# Patient Record
Sex: FEMALE | Race: WHITE | NOT HISPANIC OR LATINO | Employment: UNEMPLOYED | URBAN - METROPOLITAN AREA
[De-identification: names, ages, dates, MRNs, and addresses within clinical notes are randomized per-mention and may not be internally consistent; named-entity substitution may affect disease eponyms.]

---

## 2017-06-05 ENCOUNTER — TELEPHONE (OUTPATIENT)
Dept: PEDIATRIC CARDIOLOGY | Facility: CLINIC | Age: 22
End: 2017-06-05

## 2017-06-05 DIAGNOSIS — Q21.20 AV CANAL: Primary | ICD-10-CM

## 2017-07-31 ENCOUNTER — CLINICAL SUPPORT (OUTPATIENT)
Dept: PEDIATRIC CARDIOLOGY | Facility: CLINIC | Age: 22
End: 2017-07-31
Payer: COMMERCIAL

## 2017-07-31 ENCOUNTER — OFFICE VISIT (OUTPATIENT)
Dept: PEDIATRIC CARDIOLOGY | Facility: CLINIC | Age: 22
End: 2017-07-31
Payer: COMMERCIAL

## 2017-07-31 VITALS
HEART RATE: 90 BPM | DIASTOLIC BLOOD PRESSURE: 62 MMHG | BODY MASS INDEX: 25.64 KG/M2 | WEIGHT: 127.19 LBS | HEIGHT: 59 IN | SYSTOLIC BLOOD PRESSURE: 110 MMHG

## 2017-07-31 DIAGNOSIS — Q21.23 ATRIOVENTRICULAR CANAL (AVC), COMPLETE: Primary | ICD-10-CM

## 2017-07-31 DIAGNOSIS — I44.0 AV BLOCK, 1ST DEGREE: ICD-10-CM

## 2017-07-31 DIAGNOSIS — Q24.9 ADULT CONGENITAL HEART DISEASE: ICD-10-CM

## 2017-07-31 DIAGNOSIS — I34.0 NON-RHEUMATIC MITRAL REGURGITATION: ICD-10-CM

## 2017-07-31 DIAGNOSIS — Q90.9 DOWN'S SYNDROME: ICD-10-CM

## 2017-07-31 DIAGNOSIS — Q21.20 AV CANAL: ICD-10-CM

## 2017-07-31 DIAGNOSIS — E78.1 HYPERTRIGLYCERIDEMIA: ICD-10-CM

## 2017-07-31 DIAGNOSIS — I45.10 RIGHT BUNDLE BRANCH BLOCK: ICD-10-CM

## 2017-07-31 DIAGNOSIS — Z87.74 STATUS POST SURGERY FOR COMPLEX CONGENITAL HEART DISEASE: ICD-10-CM

## 2017-07-31 PROCEDURE — 93303 ECHO TRANSTHORACIC: CPT | Mod: 26,,, | Performed by: PEDIATRICS

## 2017-07-31 PROCEDURE — 93325 DOPPLER ECHO COLOR FLOW MAPG: CPT | Mod: 26,,, | Performed by: PEDIATRICS

## 2017-07-31 PROCEDURE — 93320 DOPPLER ECHO COMPLETE: CPT | Mod: 26,,, | Performed by: PEDIATRICS

## 2017-07-31 PROCEDURE — 93227 XTRNL ECG REC<48 HR R&I: CPT | Mod: ,,, | Performed by: PEDIATRICS

## 2017-07-31 PROCEDURE — 99215 OFFICE O/P EST HI 40 MIN: CPT | Mod: 25,S$GLB,, | Performed by: PEDIATRICS

## 2017-07-31 NOTE — PROGRESS NOTES
2017    re:Misti August  :1995       Misti August is a 22 y.o. 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  She also has Down's syndrome.  Since her last clinic visit one year ago she has done very well.  She has had no dyspnea on exertion, cyanosis, tachypnea, chest pain, palpitations, syncope, or edema.  She is active in dance class.  Mom is worried about her weight gain in spite of eating healthy.  She was placed on BCP due to severe menstrual cramping, and it has helped a lot.  She recently had lab work that was essentially normal except for a mildly elevated TG level.  Her HDL and LDL are normal.    The review of systems is as noted above. It is otherwise negative for other symptoms related to the general, neurological, psychiatric, endocrine, gastrointestinal, genitourinary, respiratory, dermatologic, musculoskeletal, hematologic, and immunologic systems.    Past Medical History:   Diagnosis Date    Atrioventricular canal (AVC), complete     AV block, 1st degree     Developmental delay     down syndrome    Mitral regurgitation      Past Surgical History:   Procedure Laterality Date    ATRIOVENTRICULAR CANAL REPAIR, COMPLETE       Family History   Problem Relation Age of Onset    No Known Problems Mother     No Known Problems Father     No Known Problems Sister     No Known Problems Brother     No Known Problems Maternal Grandmother     Lung cancer Maternal Grandfather     Heart disease Paternal Grandmother     Heart disease Paternal Grandfather     No Known Problems Maternal Aunt     No Known Problems Maternal Uncle     No Known Problems Paternal Aunt     No Known Problems Paternal Uncle     No Known Problems Other     Anemia Neg Hx     Arrhythmia Neg Hx     Asthma Neg Hx     Clotting disorder Neg Hx     Fainting Neg Hx     Heart attack Neg Hx     Heart failure Neg Hx      "Hyperlipidemia Neg Hx     Hypertension Neg Hx     Stroke Neg Hx     Atrial Septal Defect Neg Hx      Social History     Social History    Marital status: Single     Spouse name: N/A    Number of children: N/A    Years of education: N/A     Social History Main Topics    Smoking status: Never Smoker    Smokeless tobacco: None    Alcohol use No    Drug use: No    Sexual activity: Not Asked     Other Topics Concern    None     Social History Narrative    Lives at home with parents.     Current Outpatient Prescriptions on File Prior to Visit   Medication Sig Dispense Refill    desog-e.estradiol/e.estradiol (AZURETTE, 28,) 0.15-0.02 mgx21 /0.01 mg x 5 per tablet Take 1 tablet by mouth once daily.       No current facility-administered medications on file prior to visit.      No Known Allergies    /62 (BP Location: Right arm, Patient Position: Sitting, BP Method: Automatic)   Pulse 90   Ht 4' 11" (1.499 m)   Wt 57.7 kg (127 lb 3.2 oz)   LMP 07/10/2017 (Approximate)   BMI 25.69 kg/m²   Wt Readings from Last 3 Encounters:   07/31/17 57.7 kg (127 lb 3.2 oz)   07/28/16 54.9 kg (121 lb)   07/23/15 58.8 kg (129 lb 9.6 oz) (55 %, Z= 0.13)*     * Growth percentiles are based on [facility] 2-20 Years data.     Ht Readings from Last 3 Encounters:   07/31/17 4' 11" (1.499 m)   07/28/16 4' 9" (1.448 m)   07/23/15 4' 11" (1.499 m) (2 %, Z= -2.06)*     * Growth percentiles are based on [facility] 2-20 Years data.     Body mass index is 25.69 kg/m².  [unfilled]  Facility age limit for growth percentiles is 20 years.  Facility age limit for growth percentiles is 20 years.  In general, she is a very healthy-appearing dysmorphic female in no apparent distress.  She has Down's syndrome.  She is mildly overweight, with her weight up about 6 pounds since last year.  Eyelids and conjunctiva are free of erythema and drainage.  The eyes, nares, and oropharynx are clear.  The head is normocephalic and atraumatic.  The neck is supple without " jugular venous distention or thyroid enlargement.  The lungs are clear to auscultation bilaterally.  There is a well-healed median sternotomy incision.  The first and second heart sounds are normal.  There are no murmurs, gallops, rubs, or clicks in the supine or standing position.  The abdominal exam is benign without hepatosplenomegaly, tenderness, or distention.  Pulses are normal in all 4 extremities with brisk capillary refill and no clubbing, cyanosis, or edema.  No rashes are noted.    I personally reviewed the following tests performed today and my interpretation follows:  Her EKG reveals normal sinus rhythm with an incomplete right bundle branch block pattern and a NW axis.  Her echocardiogram reveals:  Images consistent with repaired AV canal-  Reconstructed tricuspid valve with trivial to mild insufficiency and no evidence of stenosis.  Normal right ventricular size.  Qualitatively good right ventricular systolic function.  RV pressure estimated as normal  Reconstructed mitral valve with mild insufficiency with predominant jet at small residual cleft in thickened anterior leaflet and no evidence of significant stenosis.  Structurally normal left ventricle with normal function  No evidence of LV outflow obstruction.  Possible enlarged right coronary artery.    Diagnoses:  1.  Very well repaired AV Canal with mild residual mitral valve insufficiency and no stenosis.  No residual shunts with excellent biventricular function.  No evidence of pulmonary hypertension.  2.  Down syndrome  3.  Concern over weight gain - She seems to hover around 125 lbs  4.  Mildly elevated TG    Discussion:  She looks great from a cardiovascular standpoint.  Her risk of endocarditis is extremely low, but given her mild residual insufficiency through the surgically repaired mitral valve and her previous tolerance of prophylactic antibodies, I agree with continuing SBEP before procedures.  Holter today.    I am very pleased with  her exercise and diet.  I commended the patient and her mother on this.  Continued healthy diet and regular low intensity exercise is recommended.  I also recommended daily fish oil.    - Follow up in 1 year in [facility] clinic with Dr. Zelaya for ECG, echo and Holter    Thank you for referring this patient to our clinic.  Please call with any questions.    Sincerely,        Reba Finn MD  Pediatric Cardiology  [facility] [address] [place]  [address]  [phone]

## 2017-08-01 PROBLEM — E78.1 HYPERTRIGLYCERIDEMIA: Status: ACTIVE | Noted: 2017-08-01

## 2018-05-30 ENCOUNTER — TELEPHONE (OUTPATIENT)
Dept: PEDIATRIC CARDIOLOGY | Facility: CLINIC | Age: 23
End: 2018-05-30

## 2018-05-30 NOTE — TELEPHONE ENCOUNTER
Schedule appt with Dr. Zelaya in [facility] on 7/9/18 @11:00am  ----- Message from Brittanie Haile sent at 5/30/2018  9:08 AM CDT -----  Contact: Pt catia Cochran can be reached at [phone]  Sammie called to schedule annual visit in [place] and all labs with appt.      Thank you!

## 2018-07-09 ENCOUNTER — CLINICAL SUPPORT (OUTPATIENT)
Dept: PEDIATRIC CARDIOLOGY | Facility: CLINIC | Age: 23
End: 2018-07-09
Payer: COMMERCIAL

## 2018-07-09 ENCOUNTER — OFFICE VISIT (OUTPATIENT)
Dept: PEDIATRIC CARDIOLOGY | Facility: CLINIC | Age: 23
End: 2018-07-09
Payer: COMMERCIAL

## 2018-07-09 VITALS
HEART RATE: 76 BPM | SYSTOLIC BLOOD PRESSURE: 98 MMHG | RESPIRATION RATE: 20 BRPM | OXYGEN SATURATION: 100 % | HEIGHT: 59 IN | BODY MASS INDEX: 24.76 KG/M2 | DIASTOLIC BLOOD PRESSURE: 60 MMHG | WEIGHT: 122.81 LBS

## 2018-07-09 DIAGNOSIS — Q24.9 ADULT CONGENITAL HEART DISEASE: ICD-10-CM

## 2018-07-09 DIAGNOSIS — Q90.9 DOWN'S SYNDROME: ICD-10-CM

## 2018-07-09 DIAGNOSIS — Q21.20 AV CANAL: Primary | ICD-10-CM

## 2018-07-09 DIAGNOSIS — Q21.20 AV CANAL: ICD-10-CM

## 2018-07-09 DIAGNOSIS — E78.1 HYPERTRIGLYCERIDEMIA: ICD-10-CM

## 2018-07-09 DIAGNOSIS — I44.0 AV BLOCK, 1ST DEGREE: ICD-10-CM

## 2018-07-09 DIAGNOSIS — Q21.23 ATRIOVENTRICULAR CANAL (AVC), COMPLETE: Primary | ICD-10-CM

## 2018-07-09 DIAGNOSIS — I34.0 NON-RHEUMATIC MITRAL REGURGITATION: ICD-10-CM

## 2018-07-09 DIAGNOSIS — Z87.74 STATUS POST SURGERY FOR COMPLEX CONGENITAL HEART DISEASE: ICD-10-CM

## 2018-07-09 PROCEDURE — 93304 ECHO TRANSTHORACIC: CPT | Mod: 26,,, | Performed by: PEDIATRICS

## 2018-07-09 PROCEDURE — 93325 DOPPLER ECHO COLOR FLOW MAPG: CPT | Mod: 26,,, | Performed by: PEDIATRICS

## 2018-07-09 PROCEDURE — 93321 DOPPLER ECHO F-UP/LMTD STD: CPT | Mod: 26,,, | Performed by: PEDIATRICS

## 2018-07-09 PROCEDURE — 99214 OFFICE O/P EST MOD 30 MIN: CPT | Mod: S$GLB,,, | Performed by: PEDIATRICS

## 2018-07-09 RX ORDER — LEVOTHYROXINE SODIUM 50 UG/1
50 TABLET ORAL DAILY
Qty: 30 TABLET | Refills: 11
Start: 2018-07-09 | End: 2019-07-09

## 2018-07-09 RX ORDER — NORGESTIMATE AND ETHINYL ESTRADIOL 7DAYSX3 LO
1 KIT ORAL DAILY
Qty: 30 TABLET | Refills: 11
Start: 2018-07-09 | End: 2019-07-09

## 2018-07-09 NOTE — PROGRESS NOTES
2018    re:Misti August  :1995    [facility]    Clarke Haines MD  [address] [facility] [address]    Dear Dr. Jerome:    Misti August is a 23 y.o. female seen today in the [facility] in [place] for follow-up of her surgically repaired complete AV canal defect.  She also has Down's syndrome.  To summarize, her diagnoses are as follows:  1.  Very well repaired AV Canal with trivial residual mitral valve insufficiency and no stenosis.  No residual shunts with excellent biventricular function.  No evidence of pulmonary hypertension.  2.  Down syndrome  3.  Much improved obesity due to improved diet and exercise  4.  Newly diagnosed hypothyroidism  5.  Mild hypertriglyceridemia with otherwise excellent lipids    Recommendations:  1.  Follow-up in 1 year with echo, EKG, and Holter in our [place] [facility] clinic  2.  Continue improved diet and exercise  3.  Follow-up with primary care physician regarding treatment of hypothyroidism  4.  I agree with continuing endocarditis prophylaxis before dental work.    Discussion:  She looks great.  I applauded their hard work with diet and exercise.  Her congenital heart disease repair is excellent.  Although there is a chance she could require additional intervention on her mitral valve as she gets older, I think this is unlikely.  Mom was concerned about the mild hypertriglyceridemia.  I reassured her that the HDL and LDL looks great, and there is really no definitive link between heart disease and mild hypertriglyceridemia.  Also, she was diagnosed with hypothyroidism at the same time.  Hypothyroidism can definitely raise your triglycerides, and there is a good chance that her triglyceride levels have already improved with therapy.    Interval history:    She has done very well over the past year.  She walks 2 miles a day, and they have been working hard on a healthy diet.   Her menstrual cramping is much improved now that she is on birth control pills.  She has graduated from high school, and she was voted homecoming queen!  She was recently started on thyroid replacement therapy secondary to elevation of her TSH.    Mom brought recent blood work that was drawn on June 25, 2018.  A comprehensive metabolic panel was normal with a creatinine of 1, AST 18, ALT 20, albumin 4.3.  Total cholesterol was 184 with an HDL of 61 and LDL of 86.  Her triglycerides were mildly elevated at 185.  Her TSH was mildly elevated at 6.388.  A CBC was normal.    The review of systems is as noted above. It is otherwise negative for other symptoms related to the general, neurological, psychiatric, endocrine, gastrointestinal, genitourinary, respiratory, dermatologic, musculoskeletal, hematologic, and immunologic systems.    Past Medical History:   Diagnosis Date    Atrioventricular canal (AVC), complete     AV block, 1st degree     Developmental delay     down syndrome    Mitral regurgitation      Past Surgical History:   Procedure Laterality Date    ATRIOVENTRICULAR CANAL REPAIR, COMPLETE       Family History   Problem Relation Age of Onset    No Known Problems Mother     No Known Problems Father     No Known Problems Sister     No Known Problems Brother     No Known Problems Maternal Grandmother     Lung cancer Maternal Grandfather     Heart disease Paternal Grandmother     Lymphoma Paternal Grandmother     Heart disease Paternal Grandfather     Lung cancer Paternal Grandfather     No Known Problems Maternal Aunt     No Known Problems Maternal Uncle     No Known Problems Paternal Aunt     No Known Problems Paternal Uncle     No Known Problems Other     Anemia Neg Hx     Arrhythmia Neg Hx     Asthma Neg Hx     Clotting disorder Neg Hx     Fainting Neg Hx     Heart attack Neg Hx     Heart failure Neg Hx     Hyperlipidemia Neg Hx     Hypertension Neg Hx     Stroke Neg Hx      "Atrial Septal Defect Neg Hx      Social History     Social History    Marital status: Single     Spouse name: N/A    Number of children: N/A    Years of education: N/A     Social History Main Topics    Smoking status: Never Smoker    Smokeless tobacco: Not on file    Alcohol use No    Drug use: No    Sexual activity: Not on file     Other Topics Concern    Not on file     Social History Narrative    Lives at home with parents.       Current Outpatient Prescriptions:     levothyroxine (SYNTHROID) 50 MCG tablet, Take 1 tablet (50 mcg total) by mouth once daily., Disp: 30 tablet, Rfl: 11    norgestimate-ethinyl estradiol (ORTHO TRI-CYCLEN LO) 0.18/0.215/0.25 mg-25 mcg tablet, Take 1 tablet by mouth once daily., Disp: 30 tablet, Rfl: 11  She is also on fish oil daily as well as a multivitamin.    Review of patient's allergies indicates:  No Known Allergies    There were no vitals taken for this visit.  Wt Readings from Last 3 Encounters:   07/09/18 55.7 kg (122 lb 12.8 oz)   07/31/17 57.7 kg (127 lb 3.2 oz)   07/28/16 54.9 kg (121 lb)     Ht Readings from Last 3 Encounters:   07/09/18 4' 11.06" (1.5 m)   07/31/17 4' 11" (1.499 m)   07/28/16 4' 9" (1.448 m)     There is no height or weight on file to calculate BMI.  [unfilled]  Facility age limit for growth percentiles is 20 years.  Facility age limit for growth percentiles is 20 years.  In general, she is a very healthy-appearing dysmorphic female in no apparent distress.  She has Down's syndrome.  Her weight is down almost 5 pounds since last year.  Eyelids and conjunctiva are free of erythema and drainage.  The eyes, nares, and oropharynx are clear.  The head is normocephalic and atraumatic.  The neck is supple without jugular venous distention or thyroid enlargement.  The lungs are clear to auscultation bilaterally.  There is a well-healed median sternotomy incision.  The first and second heart sounds are normal.  There are no murmurs, gallops, rubs, or " clicks in the supine or standing position.  The abdominal exam is benign without hepatosplenomegaly, tenderness, or distention.  Pulses are normal in all 4 extremities with brisk capillary refill and no clubbing, cyanosis, or edema.  No rashes are noted.    I personally reviewed the following tests performed today and my interpretation follows:    Her echocardiogram reveals:  Images consistent with repaired AV canal-  Reconstructed tricuspid valve with trivial to mild insufficiency and no evidence of stenosis.  Normal right ventricular size.  Qualitatively good right ventricular systolic function.  RV pressure estimated as normal  Reconstructed mitral valve with trivial insufficiency and no evidence of significant stenosis.  Structurally normal left ventricle.  Visually estimated ejection fraction is 60-65%.   No evidence of LV outflow obstruction.    Thank you for referring this patient to our clinic.  Please call with any questions.    Sincerely,        Aakahs Zelaya MD  Pediatric Cardiology  Adult Congenital Heart Disease  Pediatric Heart Failure and Transplantation  [facility] [address] [place]  [address]  [phone]

## 2019-07-05 ENCOUNTER — DOCUMENTATION ONLY (OUTPATIENT)
Dept: PEDIATRIC CARDIOLOGY | Facility: HOSPITAL | Age: 24
End: 2019-07-05

## 2019-07-08 ENCOUNTER — CLINICAL SUPPORT (OUTPATIENT)
Dept: PEDIATRIC CARDIOLOGY | Facility: CLINIC | Age: 24
End: 2019-07-08
Attending: PEDIATRICS
Payer: COMMERCIAL

## 2019-07-08 ENCOUNTER — OFFICE VISIT (OUTPATIENT)
Dept: PEDIATRIC CARDIOLOGY | Facility: CLINIC | Age: 24
End: 2019-07-08
Payer: COMMERCIAL

## 2019-07-08 ENCOUNTER — CLINICAL SUPPORT (OUTPATIENT)
Dept: PEDIATRIC CARDIOLOGY | Facility: CLINIC | Age: 24
End: 2019-07-08
Payer: COMMERCIAL

## 2019-07-08 VITALS
HEIGHT: 59 IN | HEART RATE: 73 BPM | RESPIRATION RATE: 20 BRPM | SYSTOLIC BLOOD PRESSURE: 111 MMHG | BODY MASS INDEX: 20.66 KG/M2 | OXYGEN SATURATION: 100 % | DIASTOLIC BLOOD PRESSURE: 53 MMHG | WEIGHT: 102.5 LBS

## 2019-07-08 DIAGNOSIS — Q21.23 ATRIOVENTRICULAR CANAL (AVC), COMPLETE: ICD-10-CM

## 2019-07-08 DIAGNOSIS — Q24.9 ADULT CONGENITAL HEART DISEASE: ICD-10-CM

## 2019-07-08 DIAGNOSIS — I44.0 AV BLOCK, 1ST DEGREE: ICD-10-CM

## 2019-07-08 DIAGNOSIS — Z87.74 STATUS POST SURGERY FOR COMPLEX CONGENITAL HEART DISEASE: ICD-10-CM

## 2019-07-08 PROCEDURE — 93010 EKG 12-LEAD PEDIATRIC: ICD-10-PCS | Mod: S$GLB,,, | Performed by: PEDIATRICS

## 2019-07-08 PROCEDURE — 93005 ELECTROCARDIOGRAM TRACING: CPT | Mod: S$GLB,,, | Performed by: PEDIATRICS

## 2019-07-08 PROCEDURE — 93227 XTRNL ECG REC<48 HR R&I: CPT | Mod: S$GLB,,, | Performed by: PEDIATRICS

## 2019-07-08 PROCEDURE — 99214 OFFICE O/P EST MOD 30 MIN: CPT | Mod: 25,S$GLB,, | Performed by: PEDIATRICS

## 2019-07-08 PROCEDURE — 93010 ELECTROCARDIOGRAM REPORT: CPT | Mod: S$GLB,,, | Performed by: PEDIATRICS

## 2019-07-08 PROCEDURE — 99214 PR OFFICE/OUTPT VISIT, EST, LEVL IV, 30-39 MIN: ICD-10-PCS | Mod: 25,S$GLB,, | Performed by: PEDIATRICS

## 2019-07-08 PROCEDURE — 3008F PR BODY MASS INDEX (BMI) DOCUMENTED: ICD-10-PCS | Mod: CPTII,S$GLB,, | Performed by: PEDIATRICS

## 2019-07-08 PROCEDURE — 93005 EKG 12-LEAD PEDIATRIC: ICD-10-PCS | Mod: S$GLB,,, | Performed by: PEDIATRICS

## 2019-07-08 PROCEDURE — 93227: ICD-10-PCS | Mod: S$GLB,,, | Performed by: PEDIATRICS

## 2019-07-08 PROCEDURE — 3008F BODY MASS INDEX DOCD: CPT | Mod: CPTII,S$GLB,, | Performed by: PEDIATRICS

## 2019-07-08 NOTE — LETTER
July 8, 2019      Clarke Haines MD  [address] [address]           [facility] Pediatric [facility]iology  [facility] [facility] [address]  Phone: [phone]  Fax: [phone]          Patient: Misti August   MR Number: [id number]   YOB: 1995   Date of Visit: 7/8/2019       Dear Dr. Clarke Haines:    Thank you for referring Misti August to me for evaluation. Attached you will find relevant portions of my assessment and plan of care.    If you have questions, please do not hesitate to call me. I look forward to following Misti August along with you.    Sincerely,    Aakash Zelaya MD    Enclosure  CC:  No Recipients    If you would like to receive this communication electronically, please contact [email or web address] or [phone] to request more information on [facility] Link access.    For providers and/or their staff who would like to refer a patient to [facility], please contact us through our one-stop-shop provider referral line, [facility], at [phone].    If you feel you have received this communication in error or would no longer like to receive these types of communications, please e-mail [email or web address]

## 2019-07-08 NOTE — PROGRESS NOTES
2019    re:Misti August  :1995    [facility]    Clarke Haines MD  [address] [facility] [address]    Dear Dr. Jerome:    Misti August is a 24 y.o. female seen today in the [facility] in [place] for follow-up of her surgically repaired complete AV canal defect.  She also has Down's syndrome.  To summarize, her diagnoses are as follows:  1.  Very well repaired AV Canal with trivial residual mitral valve insufficiency and no stenosis.  No residual shunts with excellent biventricular function.  No evidence of pulmonary hypertension.  2.  Down syndrome  3.  Much improved obesity due to improved diet and exercise  4.  Hypothyroidism  5.  Mild hypertriglyceridemia with otherwise excellent lipids    Recommendations:  1.  Follow-up in 1 year with echo, EKG, and Holter in our [place] [facility] clinic  2.  Continue healthy diet and exercise  3.  Follow-up with primary care physician regarding treatment of hypothyroidism  4.  I agree with continuing endocarditis prophylaxis before dental work.    Discussion:  She looks great.  I applauded their hard work with diet and exercise.  Her congenital heart disease repair is excellent.  Although there is a chance she could require additional intervention on her mitral valve as she gets older, I think this is unlikely.      Interval history:    She has done very well over the past year.  She walks 2 miles a day, and they have been working hard on a healthy diet.  She has graduated from high school.      The review of systems is as noted above. It is otherwise negative for other symptoms related to the general, neurological, psychiatric, endocrine, gastrointestinal, genitourinary, respiratory, dermatologic, musculoskeletal, hematologic, and immunologic systems.    Past Medical History:   Diagnosis Date    Atrioventricular canal (AVC), complete     AV block, 1st degree      Developmental delay     down syndrome    Mitral regurgitation      Past Surgical History:   Procedure Laterality Date    ATRIOVENTRICULAR CANAL REPAIR, COMPLETE       Family History   Problem Relation Age of Onset    No Known Problems Mother     No Known Problems Father     No Known Problems Sister     No Known Problems Brother     No Known Problems Maternal Grandmother     Lung cancer Maternal Grandfather     Heart disease Paternal Grandmother     Lymphoma Paternal Grandmother     Heart disease Paternal Grandfather     Lung cancer Paternal Grandfather     No Known Problems Maternal Aunt     No Known Problems Maternal Uncle     No Known Problems Paternal Aunt     No Known Problems Paternal Uncle     No Known Problems Other     Anemia Neg Hx     Arrhythmia Neg Hx     Asthma Neg Hx     Clotting disorder Neg Hx     Fainting Neg Hx     Heart attack Neg Hx     Heart failure Neg Hx     Hyperlipidemia Neg Hx     Hypertension Neg Hx     Stroke Neg Hx     Atrial Septal Defect Neg Hx      Social History     Socioeconomic History    Marital status: Single     Spouse name: Not on file    Number of children: Not on file    Years of education: Not on file    Highest education level: Not on file   Occupational History    Not on file   Social Needs    Financial resource strain: Not on file    Food insecurity:     Worry: Not on file     Inability: Not on file    Transportation needs:     Medical: Not on file     Non-medical: Not on file   Tobacco Use    Smoking status: Never Smoker   Substance and Sexual Activity    Alcohol use: No    Drug use: No    Sexual activity: Not on file   Lifestyle    Physical activity:     Days per week: Not on file     Minutes per session: Not on file    Stress: Not on file   Relationships    Social connections:     Talks on phone: Not on file     Gets together: Not on file     Attends Sabianist service: Not on file     Active member of club or organization: Not  "on file     Attends meetings of clubs or organizations: Not on file     Relationship status: Not on file   Other Topics Concern    Not on file   Social History Narrative    Lives at home with parents.       Current Outpatient Medications:     levothyroxine (SYNTHROID) 50 MCG tablet, Take 1 tablet (50 mcg total) by mouth once daily., Disp: 30 tablet, Rfl: 11    norgestimate-ethinyl estradiol (ORTHO TRI-CYCLEN LO) 0.18/0.215/0.25 mg-25 mcg tablet, Take 1 tablet by mouth once daily., Disp: 30 tablet, Rfl: 11  She is also on fish oil daily as well as a multivitamin.    Review of patient's allergies indicates:  No Known Allergies    BP (!) 111/53   Pulse 73   Resp 20   Ht 4' 11.06" (1.5 m)   Wt 46.5 kg (102 lb 8 oz)   SpO2 100%   BMI 20.66 kg/m²   Wt Readings from Last 3 Encounters:   07/08/19 46.5 kg (102 lb 8 oz)   07/09/18 55.7 kg (122 lb 12.8 oz)   07/31/17 57.7 kg (127 lb 3.2 oz)     Ht Readings from Last 3 Encounters:   07/08/19 4' 11.06" (1.5 m)   07/09/18 4' 11.06" (1.5 m)   07/31/17 4' 11" (1.499 m)     Body mass index is 20.66 kg/m².  [unfilled]  Facility age limit for growth percentiles is 20 years.  Facility age limit for growth percentiles is 20 years.  In general, she is a very healthy-appearing dysmorphic female in no apparent distress.  She has Down's syndrome.  Eyelids and conjunctiva are free of erythema and drainage.  The eyes, nares, and oropharynx are clear.  The head is normocephalic and atraumatic.  The neck is supple without jugular venous distention or thyroid enlargement.  The lungs are clear to auscultation bilaterally.  There is a well-healed median sternotomy incision.  The first and second heart sounds are normal.  There are no murmurs, gallops, rubs, or clicks in the supine or standing position.  The abdominal exam is benign without hepatosplenomegaly, tenderness, or distention.  Pulses are normal in all 4 extremities with brisk capillary refill and no clubbing, cyanosis, or edema.  " No rashes are noted.    I personally reviewed the following tests performed today and my interpretation follows:    I personally reviewed the echocardiogram performed in clinic today.  There is excellent biventricular function.  There is no chamber enlargement.  Trivial to mild insufficiency of the left-sided AV valve is noted.  There is trivial right-sided AV valve insufficiency.  There is no stenosis.  Estimated right ventricular systolic pressure is normal.  There is no left ventricular outflow track obstruction.     An EKG performed in clinic today reveals sinus bradycardia with a rate of 57.  An incomplete right bundle branch block is noted.  There is borderline left axis deviation.    I reviewed blood work performed recently on this patient.  The CBC and comprehensive metabolic panels are completely normal.  Her thyroid function studies are normal.  Her fasting lipids look great.  Total cholesterol 166.  HDL 70.  LDL 68.  Triglycerides 139.  Reports are scanned in media.    Thank you for referring this patient to our clinic.  Please call with any questions.    Sincerely,        Aakash Zelaya MD  Pediatric Cardiology  Adult Congenital Heart Disease  Pediatric Heart Failure and Transplantation  [facility] [address] [place]  [address]  [phone]

## 2019-07-17 LAB
OHS CV EVENT MONITOR DAY: 1
OHS CV HOLTER LENGTH DECIMAL HOURS: 24
OHS CV HOLTER LENGTH HOURS: 0
OHS CV HOLTER LENGTH MINUTES: 0

## 2020-06-17 DIAGNOSIS — I34.0 NONRHEUMATIC MITRAL VALVE REGURGITATION: ICD-10-CM

## 2020-06-17 DIAGNOSIS — Q24.9 ADULT CONGENITAL HEART DISEASE: ICD-10-CM

## 2020-06-17 DIAGNOSIS — Z87.74 STATUS POST SURGERY FOR COMPLEX CONGENITAL HEART DISEASE: ICD-10-CM

## 2020-06-17 DIAGNOSIS — Q21.23 ATRIOVENTRICULAR CANAL (AVC), COMPLETE: Primary | ICD-10-CM

## 2020-06-17 DIAGNOSIS — I45.10 RIGHT BUNDLE BRANCH BLOCK: ICD-10-CM

## 2020-08-10 ENCOUNTER — CLINICAL SUPPORT (OUTPATIENT)
Dept: PEDIATRIC CARDIOLOGY | Facility: CLINIC | Age: 25
End: 2020-08-10
Attending: PEDIATRICS
Payer: COMMERCIAL

## 2020-08-10 ENCOUNTER — CLINICAL SUPPORT (OUTPATIENT)
Dept: PEDIATRIC CARDIOLOGY | Facility: CLINIC | Age: 25
End: 2020-08-10
Payer: COMMERCIAL

## 2020-08-10 ENCOUNTER — OFFICE VISIT (OUTPATIENT)
Dept: PEDIATRIC CARDIOLOGY | Facility: CLINIC | Age: 25
End: 2020-08-10
Payer: COMMERCIAL

## 2020-08-10 VITALS
RESPIRATION RATE: 20 BRPM | OXYGEN SATURATION: 100 % | HEIGHT: 59 IN | BODY MASS INDEX: 21.11 KG/M2 | DIASTOLIC BLOOD PRESSURE: 59 MMHG | HEART RATE: 60 BPM | SYSTOLIC BLOOD PRESSURE: 116 MMHG | WEIGHT: 104.69 LBS

## 2020-08-10 DIAGNOSIS — Z87.74 STATUS POST SURGERY FOR COMPLEX CONGENITAL HEART DISEASE: ICD-10-CM

## 2020-08-10 DIAGNOSIS — I34.0 NONRHEUMATIC MITRAL VALVE REGURGITATION: ICD-10-CM

## 2020-08-10 DIAGNOSIS — Q21.23 ATRIOVENTRICULAR CANAL (AVC), COMPLETE: ICD-10-CM

## 2020-08-10 DIAGNOSIS — Q24.9 ADULT CONGENITAL HEART DISEASE: ICD-10-CM

## 2020-08-10 DIAGNOSIS — I45.10 RIGHT BUNDLE BRANCH BLOCK: ICD-10-CM

## 2020-08-10 PROCEDURE — 93000 EKG 12-LEAD PEDIATRIC: ICD-10-PCS | Mod: S$GLB,,, | Performed by: PEDIATRICS

## 2020-08-10 PROCEDURE — 99214 OFFICE O/P EST MOD 30 MIN: CPT | Mod: 25,S$GLB,, | Performed by: PEDIATRICS

## 2020-08-10 PROCEDURE — 93227 XTRNL ECG REC<48 HR R&I: CPT | Mod: S$GLB,,, | Performed by: PEDIATRICS

## 2020-08-10 PROCEDURE — 3008F BODY MASS INDEX DOCD: CPT | Mod: CPTII,S$GLB,, | Performed by: PEDIATRICS

## 2020-08-10 PROCEDURE — 99214 PR OFFICE/OUTPT VISIT, EST, LEVL IV, 30-39 MIN: ICD-10-PCS | Mod: 25,S$GLB,, | Performed by: PEDIATRICS

## 2020-08-10 PROCEDURE — 93000 ELECTROCARDIOGRAM COMPLETE: CPT | Mod: S$GLB,,, | Performed by: PEDIATRICS

## 2020-08-10 PROCEDURE — 3008F PR BODY MASS INDEX (BMI) DOCUMENTED: ICD-10-PCS | Mod: CPTII,S$GLB,, | Performed by: PEDIATRICS

## 2020-08-10 PROCEDURE — 93227: ICD-10-PCS | Mod: S$GLB,,, | Performed by: PEDIATRICS

## 2020-08-10 RX ORDER — LEVOTHYROXINE SODIUM 75 UG/1
75 TABLET ORAL DAILY
COMMUNITY
Start: 2020-07-24 | End: 2021-08-16

## 2020-08-10 RX ORDER — AMOXICILLIN 500 MG
1 CAPSULE ORAL DAILY
COMMUNITY

## 2020-08-10 RX ORDER — NORGESTIMATE AND ETHINYL ESTRADIOL 7DAYSX3 28
1 KIT ORAL DAILY
COMMUNITY
Start: 2020-07-15

## 2020-08-10 NOTE — PROGRESS NOTES
08/10/2020    re:Misti August  :1995    [facility]    Clarke Haines MD   [address] [facility] [address]    Dear Dr. Haines:    Misti August is a 25 y.o. female seen today in the [facility] in [place] for follow-up of her surgically repaired complete AV canal defect.  She also has Down's syndrome.  To summarize, her diagnoses are as follows:  1.  Very well repaired AV Canal with mild residual mitral valve insufficiency and no stenosis.  No residual shunts with excellent biventricular function.  No evidence of pulmonary hypertension.  2.  Down syndrome  3.  Much improved obesity due to improved diet and exercise  4.  Hypothyroidism  5.  Mild hypertriglyceridemia with otherwise excellent lipids    Recommendations:  1.  Follow-up in 1 year with echo, EKG in our [place] [facility] clinic  2.  Continue healthy diet and exercise  3.  Follow-up with primary care physician regarding treatment of hypothyroidism  4.  I agree with continuing endocarditis prophylaxis before dental work.  5.  Holter today.  Likely repeat every 2-3 years.    Discussion:  She looks great.  I again applaud their hard work with diet and exercise.  Her congenital heart disease repair is excellent.  Although there is a chance she could require additional intervention on her mitral valve as she gets older, I think this is unlikely.      Interval history:    She has done very well over the past year.  She walks on the treadmill every day, and they have been working hard on a healthy diet.  No chest pain, palpitations, syncope, dyspnea on exertion, shortness of breath.     The review of systems is as noted above. It is otherwise negative for other symptoms related to the general, neurological, psychiatric, endocrine, gastrointestinal, genitourinary, respiratory, dermatologic, musculoskeletal, hematologic, and immunologic systems.    Past Medical History:    Diagnosis Date    Atrioventricular canal (AVC), complete     AV block, 1st degree     Developmental delay     down syndrome    Mitral regurgitation      Past Surgical History:   Procedure Laterality Date    ATRIOVENTRICULAR CANAL REPAIR, COMPLETE       Family History   Problem Relation Age of Onset    No Known Problems Mother     No Known Problems Father     No Known Problems Sister     No Known Problems Brother     No Known Problems Maternal Grandmother     Lung cancer Maternal Grandfather     Heart disease Paternal Grandmother     Lymphoma Paternal Grandmother     Heart disease Paternal Grandfather     Lung cancer Paternal Grandfather     No Known Problems Maternal Aunt     No Known Problems Maternal Uncle     No Known Problems Paternal Aunt     No Known Problems Paternal Uncle     No Known Problems Other     Anemia Neg Hx     Arrhythmia Neg Hx     Asthma Neg Hx     Clotting disorder Neg Hx     Fainting Neg Hx     Heart attack Neg Hx     Heart failure Neg Hx     Hyperlipidemia Neg Hx     Hypertension Neg Hx     Stroke Neg Hx     Atrial Septal Defect Neg Hx      Social History     Socioeconomic History    Marital status: Single     Spouse name: Not on file    Number of children: Not on file    Years of education: Not on file    Highest education level: Not on file   Occupational History    Not on file   Social Needs    Financial resource strain: Not on file    Food insecurity     Worry: Not on file     Inability: Not on file    Transportation needs     Medical: Not on file     Non-medical: Not on file   Tobacco Use    Smoking status: Never Smoker   Substance and Sexual Activity    Alcohol use: No    Drug use: No    Sexual activity: Not on file   Lifestyle    Physical activity     Days per week: Not on file     Minutes per session: Not on file    Stress: Not on file   Relationships    Social connections     Talks on phone: Not on file     Gets together: Not on file      "Attends [employer] service: Not on file     Active member of club or organization: Not on file     Attends meetings of clubs or organizations: Not on file     Relationship status: Not on file   Other Topics Concern    Not on file   Social History Narrative    Lives at home with parents.     Current Outpatient Medications on File Prior to Visit   Medication Sig Dispense Refill    EUTHYROX 75 mcg tablet Take 75 mcg by mouth once daily.      multivitamin capsule Take 1 capsule by mouth once daily.      omega-3 fatty acids/fish oil (FISH OIL-OMEGA-3 FATTY ACIDS) 300-1,000 mg capsule Take 1 capsule by mouth once daily.      TRI-SPRINTEC, 28, 0.18/0.215/0.25 mg-35 mcg (28) tablet Take 1 tablet by mouth once daily.       No current facility-administered medications on file prior to visit.      Review of patient's allergies indicates:  No Known Allergies      Vitals:    08/10/20 0834   BP: (!) 116/59   BP Location: Right arm   Patient Position: Sitting   BP Method: Medium (Automatic)   Pulse: 60   Resp: 20   SpO2: 100%   Weight: 47.5 kg (104 lb 11.2 oz)   Height: 4' 11.06" (1.5 m)     In general, she is a very healthy-appearing dysmorphic female in no apparent distress.  She has Down's syndrome.  Eyelids and conjunctiva are free of erythema and drainage.  The eyes, nares, and oropharynx are clear.  The head is normocephalic and atraumatic.  The neck is supple without jugular venous distention or thyroid enlargement.  The lungs are clear to auscultation bilaterally.  There is a well-healed median sternotomy incision.  The first and second heart sounds are normal.  There are no murmurs, gallops, rubs, or clicks in the seated position.  The abdominal exam is benign without hepatosplenomegaly, tenderness, or distention.  Pulses are normal in all 4 extremities with brisk capillary refill and no clubbing, cyanosis, or edema.  No rashes are noted.    I personally reviewed the following tests performed today and my " interpretation follows:    I personally reviewed the echocardiogram performed in clinic today.  There is excellent biventricular function.  There is no chamber enlargement other than possible mild LAE.  Mild insufficiency of the left-sided AV valve is noted.  There is trivial right-sided AV valve insufficiency.  There is no stenosis.  Estimated right ventricular systolic pressure is normal.  There is no left ventricular outflow track obstruction.     An EKG performed in clinic today reveals sinus bradycardia with a rate of 53.  Two different P-wave morphologies are noted.  An incomplete right bundle branch block is noted.  There is a superior axis.    Thank you for referring this patient to our clinic.  Please call with any questions.    Sincerely,        Aakash Zelaya MD  Pediatric Cardiology  Adult Congenital Heart Disease  Pediatric Heart Failure and Transplantation  [facility] [address] [place]  [address]  [phone]

## 2020-08-17 LAB
OHS CV EVENT MONITOR DAY: 1
OHS CV HOLTER LENGTH DECIMAL HOURS: 26
OHS CV HOLTER LENGTH HOURS: 2
OHS CV HOLTER LENGTH MINUTES: 0

## 2021-05-12 ENCOUNTER — PATIENT MESSAGE (OUTPATIENT)
Dept: RESEARCH | Facility: HOSPITAL | Age: 26
End: 2021-05-12

## 2021-08-13 DIAGNOSIS — I44.0 AV BLOCK, 1ST DEGREE: ICD-10-CM

## 2021-08-13 DIAGNOSIS — Q90.9 DOWN'S SYNDROME: ICD-10-CM

## 2021-08-13 DIAGNOSIS — I45.10 RIGHT BUNDLE BRANCH BLOCK: ICD-10-CM

## 2021-08-13 DIAGNOSIS — Q21.23 ATRIOVENTRICULAR CANAL (AVC), COMPLETE: Primary | ICD-10-CM

## 2021-08-13 DIAGNOSIS — Q24.9 ADULT CONGENITAL HEART DISEASE: ICD-10-CM

## 2021-08-16 ENCOUNTER — OFFICE VISIT (OUTPATIENT)
Dept: PEDIATRIC CARDIOLOGY | Facility: CLINIC | Age: 26
End: 2021-08-16
Payer: COMMERCIAL

## 2021-08-16 ENCOUNTER — CLINICAL SUPPORT (OUTPATIENT)
Dept: PEDIATRIC CARDIOLOGY | Facility: CLINIC | Age: 26
End: 2021-08-16
Payer: COMMERCIAL

## 2021-08-16 VITALS
OXYGEN SATURATION: 98 % | HEIGHT: 59 IN | DIASTOLIC BLOOD PRESSURE: 74 MMHG | WEIGHT: 102.5 LBS | RESPIRATION RATE: 16 BRPM | BODY MASS INDEX: 20.66 KG/M2 | HEART RATE: 62 BPM | SYSTOLIC BLOOD PRESSURE: 121 MMHG

## 2021-08-16 DIAGNOSIS — Q90.9 DOWN'S SYNDROME: ICD-10-CM

## 2021-08-16 DIAGNOSIS — I44.0 AV BLOCK, 1ST DEGREE: ICD-10-CM

## 2021-08-16 DIAGNOSIS — Q21.23 ATRIOVENTRICULAR CANAL (AVC), COMPLETE: ICD-10-CM

## 2021-08-16 DIAGNOSIS — I45.10 RIGHT BUNDLE BRANCH BLOCK: ICD-10-CM

## 2021-08-16 DIAGNOSIS — Q24.9 ADULT CONGENITAL HEART DISEASE: ICD-10-CM

## 2021-08-16 PROCEDURE — 93000 ELECTROCARDIOGRAM COMPLETE: CPT | Mod: S$GLB,,, | Performed by: PEDIATRICS

## 2021-08-16 PROCEDURE — 99214 OFFICE O/P EST MOD 30 MIN: CPT | Mod: 25,S$GLB,, | Performed by: PEDIATRICS

## 2021-08-16 PROCEDURE — 3008F BODY MASS INDEX DOCD: CPT | Mod: CPTII,S$GLB,, | Performed by: PEDIATRICS

## 2021-08-16 PROCEDURE — 3078F PR MOST RECENT DIASTOLIC BLOOD PRESSURE < 80 MM HG: ICD-10-PCS | Mod: CPTII,S$GLB,, | Performed by: PEDIATRICS

## 2021-08-16 PROCEDURE — 3078F DIAST BP <80 MM HG: CPT | Mod: CPTII,S$GLB,, | Performed by: PEDIATRICS

## 2021-08-16 PROCEDURE — 99214 PR OFFICE/OUTPT VISIT, EST, LEVL IV, 30-39 MIN: ICD-10-PCS | Mod: 25,S$GLB,, | Performed by: PEDIATRICS

## 2021-08-16 PROCEDURE — 3074F PR MOST RECENT SYSTOLIC BLOOD PRESSURE < 130 MM HG: ICD-10-PCS | Mod: CPTII,S$GLB,, | Performed by: PEDIATRICS

## 2021-08-16 PROCEDURE — 1159F PR MEDICATION LIST DOCUMENTED IN MEDICAL RECORD: ICD-10-PCS | Mod: CPTII,S$GLB,, | Performed by: PEDIATRICS

## 2021-08-16 PROCEDURE — 93000 EKG 12-LEAD PEDIATRIC: ICD-10-PCS | Mod: S$GLB,,, | Performed by: PEDIATRICS

## 2021-08-16 PROCEDURE — 3008F PR BODY MASS INDEX (BMI) DOCUMENTED: ICD-10-PCS | Mod: CPTII,S$GLB,, | Performed by: PEDIATRICS

## 2021-08-16 PROCEDURE — 3074F SYST BP LT 130 MM HG: CPT | Mod: CPTII,S$GLB,, | Performed by: PEDIATRICS

## 2021-08-16 PROCEDURE — 1159F MED LIST DOCD IN RCRD: CPT | Mod: CPTII,S$GLB,, | Performed by: PEDIATRICS

## 2021-08-16 RX ORDER — LEVOTHYROXINE SODIUM 88 UG/1
88 TABLET ORAL EVERY MORNING
COMMUNITY
Start: 2021-07-19

## 2022-04-10 NOTE — TELEPHONE ENCOUNTER
----- Message from Hannah Granger sent at 6/5/2017  2:18 PM CDT -----  Contact: Mom Sammie [phone]  Mom called to schedule patient for [facility], but there were no openings for 08/01/2017. Mom states patient needs to be seen before school restarts on 08/04/2017. Please advise.    Patient is down about 9 lbs since his visit two months ago. Continue with healthy eating and increased physical activity.

## 2022-08-11 ENCOUNTER — TELEPHONE (OUTPATIENT)
Dept: PEDIATRIC CARDIOLOGY | Facility: CLINIC | Age: 27
End: 2022-08-11
Payer: COMMERCIAL

## 2022-08-11 DIAGNOSIS — Q24.9 ADULT CONGENITAL HEART DISEASE: ICD-10-CM

## 2022-08-11 DIAGNOSIS — Q21.23 ATRIOVENTRICULAR CANAL (AVC), COMPLETE: Primary | ICD-10-CM

## 2022-08-11 DIAGNOSIS — I45.10 RIGHT BUNDLE BRANCH BLOCK: ICD-10-CM

## 2022-08-11 DIAGNOSIS — I34.0 NONRHEUMATIC MITRAL VALVE REGURGITATION: ICD-10-CM

## 2022-08-11 NOTE — TELEPHONE ENCOUNTER
Appointment scheduled for this Monday in [place] start time around 11 AM, mom verbalized understand all information provided   ----- Message from Peggy Lux sent at 8/11/2022 11:29 AM CDT -----  Mom needs to schedule patient's appt in [place] she received a letter in June              Mom [phone]              Thank you   Scheduling

## 2022-08-15 ENCOUNTER — CLINICAL SUPPORT (OUTPATIENT)
Dept: PEDIATRIC CARDIOLOGY | Facility: CLINIC | Age: 27
End: 2022-08-15
Attending: PEDIATRICS
Payer: COMMERCIAL

## 2022-08-15 ENCOUNTER — OFFICE VISIT (OUTPATIENT)
Dept: PEDIATRIC CARDIOLOGY | Facility: CLINIC | Age: 27
End: 2022-08-15
Payer: COMMERCIAL

## 2022-08-15 VITALS
BODY MASS INDEX: 21.73 KG/M2 | SYSTOLIC BLOOD PRESSURE: 117 MMHG | HEIGHT: 59 IN | OXYGEN SATURATION: 98 % | HEART RATE: 63 BPM | RESPIRATION RATE: 16 BRPM | DIASTOLIC BLOOD PRESSURE: 57 MMHG | WEIGHT: 107.81 LBS

## 2022-08-15 DIAGNOSIS — I34.0 NONRHEUMATIC MITRAL VALVE REGURGITATION: ICD-10-CM

## 2022-08-15 DIAGNOSIS — Q21.23 ATRIOVENTRICULAR CANAL (AVC), COMPLETE: ICD-10-CM

## 2022-08-15 DIAGNOSIS — Q24.9 ADULT CONGENITAL HEART DISEASE: ICD-10-CM

## 2022-08-15 DIAGNOSIS — I45.10 RIGHT BUNDLE BRANCH BLOCK: ICD-10-CM

## 2022-08-15 PROCEDURE — 3078F DIAST BP <80 MM HG: CPT | Mod: CPTII,S$GLB,, | Performed by: PEDIATRICS

## 2022-08-15 PROCEDURE — 3074F PR MOST RECENT SYSTOLIC BLOOD PRESSURE < 130 MM HG: ICD-10-PCS | Mod: CPTII,S$GLB,, | Performed by: PEDIATRICS

## 2022-08-15 PROCEDURE — 3074F SYST BP LT 130 MM HG: CPT | Mod: CPTII,S$GLB,, | Performed by: PEDIATRICS

## 2022-08-15 PROCEDURE — 99214 OFFICE O/P EST MOD 30 MIN: CPT | Mod: 25,S$GLB,, | Performed by: PEDIATRICS

## 2022-08-15 PROCEDURE — 3078F PR MOST RECENT DIASTOLIC BLOOD PRESSURE < 80 MM HG: ICD-10-PCS | Mod: CPTII,S$GLB,, | Performed by: PEDIATRICS

## 2022-08-15 PROCEDURE — 93005 EKG 12-LEAD PEDIATRIC: ICD-10-PCS | Mod: S$GLB,,, | Performed by: PEDIATRICS

## 2022-08-15 PROCEDURE — 93010 EKG 12-LEAD PEDIATRIC: ICD-10-PCS | Mod: S$GLB,,, | Performed by: PEDIATRICS

## 2022-08-15 PROCEDURE — 99214 PR OFFICE/OUTPT VISIT, EST, LEVL IV, 30-39 MIN: ICD-10-PCS | Mod: 25,S$GLB,, | Performed by: PEDIATRICS

## 2022-08-15 PROCEDURE — 3008F PR BODY MASS INDEX (BMI) DOCUMENTED: ICD-10-PCS | Mod: CPTII,S$GLB,, | Performed by: PEDIATRICS

## 2022-08-15 PROCEDURE — 93005 ELECTROCARDIOGRAM TRACING: CPT | Mod: S$GLB,,, | Performed by: PEDIATRICS

## 2022-08-15 PROCEDURE — 3008F BODY MASS INDEX DOCD: CPT | Mod: CPTII,S$GLB,, | Performed by: PEDIATRICS

## 2022-08-15 PROCEDURE — 93010 ELECTROCARDIOGRAM REPORT: CPT | Mod: S$GLB,,, | Performed by: PEDIATRICS

## 2022-08-15 NOTE — PROGRESS NOTES
08/15/2022    re:Misti August  :1995    [facility]    Hunter Mccarty MD   [address] [facility] [address]    Dear Dr. Mccarty:    Misti August is a 27 y.o. female seen today in the [facility] in [place] for follow-up of her surgically repaired complete AV canal defect.  She also has Down's syndrome.  To summarize, her diagnoses are as follows:  1.  Very well repaired AV Canal with mild residual mitral valve insufficiency and no stenosis.  No residual shunts with excellent biventricular function.  No evidence of pulmonary hypertension.  2.  Down syndrome  3.  Resolved obesity due to improved diet and exercise  4.  Hypothyroidism, well treated  5.  Excellent lipids    Recommendations:  1.  Follow-up in 1 year with echo, EKG, 24 hour holter in our [place] [facility] clinic  2.  Continue healthy diet and exercise  3.  Follow-up with primary care physician regarding treatment of hypothyroidism  4.  I agree with continuing endocarditis prophylaxis before dental work.    Discussion:  She looks great in clinic today.  She has a very well repaired complete atrioventricular canal defect with only mild left and right-sided valve insufficiency.  She has excellent ventricular function and no residual shunting.  She will not require additional surgical intervention for her congenital heart disease.  She is at risk for arrhythmia.  I reassured her mother that the heart looks great.  They are doing a wonderful job with diet and exercise.    Interval history:    She has done very well over the past year.  She walks on the treadmill every day, and they have been working hard on a healthy diet.  No chest pain, palpitations, syncope, dyspnea on exertion, shortness of breath.  She has gained a small amount of weight which frustrates the mother.  Patient had extensive blood work just recently, and her thyroid levels and cholesterol were fine.    The review  of systems is as noted above. It is otherwise negative for other symptoms related to the general, neurological, psychiatric, endocrine, gastrointestinal, genitourinary, respiratory, dermatologic, musculoskeletal, hematologic, and immunologic systems.    Past Medical History:   Diagnosis Date    Atrioventricular canal (AVC), complete     AV block, 1st degree     Developmental delay     down syndrome    Mitral regurgitation      Past Surgical History:   Procedure Laterality Date    ATRIOVENTRICULAR CANAL REPAIR, COMPLETE       Family History   Problem Relation Age of Onset    No Known Problems Mother     No Known Problems Father     No Known Problems Sister     No Known Problems Brother     No Known Problems Maternal Grandmother     Lung cancer Maternal Grandfather     Heart disease Paternal Grandmother     Lymphoma Paternal Grandmother     Heart disease Paternal Grandfather     Lung cancer Paternal Grandfather     No Known Problems Maternal Aunt     No Known Problems Maternal Uncle     No Known Problems Paternal Aunt     No Known Problems Paternal Uncle     No Known Problems Other     Anemia Neg Hx     Arrhythmia Neg Hx     Asthma Neg Hx     Clotting disorder Neg Hx     Fainting Neg Hx     Heart attack Neg Hx     Heart failure Neg Hx     Hyperlipidemia Neg Hx     Hypertension Neg Hx     Stroke Neg Hx     Atrial Septal Defect Neg Hx      Social History     Socioeconomic History    Marital status: Single   Tobacco Use    Smoking status: Never Smoker    Smokeless tobacco: Never Used   Substance and Sexual Activity    Alcohol use: No    Drug use: No   Social History Narrative    Lives at home with parents.     Current Outpatient Medications on File Prior to Visit   Medication Sig Dispense Refill    levothyroxine (SYNTHROID) 88 MCG tablet Take 88 mcg by mouth every morning.      multivitamin capsule Take 1 capsule by mouth once daily.      omega-3 fatty acids/fish oil (FISH  "OIL-OMEGA-3 FATTY ACIDS) 300-1,000 mg capsule Take 1 capsule by mouth once daily.      TRI-SPRINTEC, 28, 0.18/0.215/0.25 mg-35 mcg (28) tablet Take 1 tablet by mouth once daily.       No current facility-administered medications on file prior to visit.     Review of patient's allergies indicates:  No Known Allergies     Vitals:    08/15/22 1052   BP: (!) 117/57   BP Location: Right arm   Patient Position: Sitting   BP Method: Medium (Automatic)   Pulse: 63   Resp: 16   SpO2: 98%   Weight: 48.9 kg (107 lb 12.8 oz)   Height: 4' 11.06" (1.5 m)       In general, she is a very healthy-appearing dysmorphic female in no apparent distress.  She has Down's syndrome.  Eyelids and conjunctiva are free of erythema and drainage.  The eyes, nares, and oropharynx are clear.  The head is normocephalic and atraumatic.  The neck is supple without jugular venous distention or thyroid enlargement.  The lungs are clear to auscultation bilaterally.  There is a well-healed median sternotomy incision.  The first and second heart sounds are normal.  There are no murmurs, gallops, rubs, or clicks in the seated position.  The abdominal exam is benign without hepatosplenomegaly, tenderness, or distention.  Pulses are normal in all 4 extremities with brisk capillary refill and no clubbing, cyanosis, or edema.  No rashes are noted.    I personally reviewed the following tests performed today and my interpretation follows:    Her echocardiogram looks great.  Excellent biventricular function.  No residual shunting.  Mild mitral and tricuspid insufficiency.    Her EKG looks great.  Sinus bradycardia with rate 57 beats per minute.  Incomplete right bundle branch block.  Superior axis.    Thank you for referring this patient to our clinic.  Please call with any questions.    Sincerely,        Aakash Zelaya MD  Pediatric Cardiology  Adult Congenital Heart Disease  Pediatric Heart Failure and Transplantation  [facility]  [address] [place]  [address]  [phone]

## 2023-06-22 ENCOUNTER — PATIENT MESSAGE (OUTPATIENT)
Dept: CARDIOLOGY | Facility: CLINIC | Age: 28
End: 2023-06-22
Payer: COMMERCIAL

## 2023-06-22 DIAGNOSIS — Q24.9 ADULT CONGENITAL HEART DISEASE: ICD-10-CM

## 2023-06-22 DIAGNOSIS — Z87.74 STATUS POST SURGERY FOR COMPLEX CONGENITAL HEART DISEASE: ICD-10-CM

## 2023-06-22 DIAGNOSIS — I44.0 AV BLOCK, 1ST DEGREE: ICD-10-CM

## 2023-06-22 DIAGNOSIS — I34.0 NONRHEUMATIC MITRAL VALVE REGURGITATION: ICD-10-CM

## 2023-06-22 DIAGNOSIS — Q90.9 DOWN'S SYNDROME: ICD-10-CM

## 2023-06-22 DIAGNOSIS — Q21.23 ATRIOVENTRICULAR CANAL (AVC), COMPLETE: Primary | ICD-10-CM

## 2023-10-14 NOTE — PROGRESS NOTES
10/16/2023    re:Misti August  :1995    [facility]    Hunter Mccarty MD   [address] [facility] [address]    Dear Dr. Mccarty:    Misti August is a 28 y.o. female seen today in the [facility] in [place] for follow-up of her surgically repaired complete AV canal defect.  She also has Down's syndrome.  To summarize, her diagnoses are as follows:  1.  Very well repaired AV Canal with mild residual mitral valve insufficiency and no stenosis.  No residual shunts with excellent biventricular function.  No evidence of pulmonary hypertension.  2.  Down syndrome  3.  Resolved obesity due to improved diet and exercise  4.  Hypothyroidism, well treated  5.  Excellent lipids    Recommendations:  1.  Follow-up in 1 year with echo, EKG, 24 hour holter in our [place] [facility] clinic  2.  Continue healthy diet and exercise  3.  Follow-up with primary care physician regarding treatment of hypothyroidism  4.  I agree with continuing endocarditis prophylaxis before dental work.    Discussion:  She looks great in clinic today.  She has a very well repaired complete atrioventricular canal defect with only mild left and right-sided valve insufficiency.  She has excellent ventricular function and no residual shunting.  She will not require additional surgical intervention for her congenital heart disease.  She is at risk for arrhythmia.  I reassured her mother that the heart looks great.  They are doing a wonderful job with diet and exercise.    Interval history:    She has done very well over the past year.  She walks on the treadmill 6 days a week and dances in dance class for 3 hours the other days, and they have been working hard on a healthy diet.  No chest pain, palpitations, syncope, dyspnea on exertion, shortness of breath.      The review of systems is as noted above. It is otherwise negative for other symptoms related to the general,  neurological, psychiatric, endocrine, gastrointestinal, genitourinary, respiratory, dermatologic, musculoskeletal, hematologic, and immunologic systems.    Past Medical History:   Diagnosis Date    Atrioventricular canal (AVC), complete     AV block, 1st degree     Developmental delay     down syndrome    Mitral regurgitation      Past Surgical History:   Procedure Laterality Date    ATRIOVENTRICULAR CANAL REPAIR, COMPLETE       Family History   Problem Relation Age of Onset    No Known Problems Mother     No Known Problems Father     No Known Problems Sister     No Known Problems Brother     No Known Problems Maternal Grandmother     Lung cancer Maternal Grandfather     Heart disease Paternal Grandmother     Lymphoma Paternal Grandmother     Heart disease Paternal Grandfather     Lung cancer Paternal Grandfather     No Known Problems Maternal Aunt     No Known Problems Maternal Uncle     No Known Problems Paternal Aunt     No Known Problems Paternal Uncle     No Known Problems Other     Anemia Neg Hx     Arrhythmia Neg Hx     Asthma Neg Hx     Clotting disorder Neg Hx     Fainting Neg Hx     Heart attack Neg Hx     Heart failure Neg Hx     Hyperlipidemia Neg Hx     Hypertension Neg Hx     Stroke Neg Hx     Atrial Septal Defect Neg Hx      Social History     Socioeconomic History    Marital status: Single   Tobacco Use    Smoking status: Never    Smokeless tobacco: Never   Substance and Sexual Activity    Alcohol use: No    Drug use: No   Social History Narrative    Lives at home with parents.     Current Outpatient Medications on File Prior to Visit   Medication Sig Dispense Refill    levothyroxine (SYNTHROID) 88 MCG tablet Take 88 mcg by mouth every morning.      multivitamin capsule Take 1 capsule by mouth once daily.      omega-3 fatty acids/fish oil (FISH OIL-OMEGA-3 FATTY ACIDS) 300-1,000 mg capsule Take 1 capsule by mouth once daily.      TRI-SPRINTEC, 28, 0.18/0.215/0.25 mg-35 mcg (28) tablet Take 1 tablet  "by mouth once daily.      betamethasone dipropionate 0.05 % cream SMARTSI Topical Daily       No current facility-administered medications on file prior to visit.     Review of patient's allergies indicates:  No Known Allergies     Vitals:    10/16/23 0905   BP: (!) 100/50   BP Location: Right arm   Patient Position: Sitting   BP Method: Medium (Automatic)   Pulse: 68   Resp: 16   SpO2: 100%   Weight: 47.9 kg (105 lb 11.2 oz)   Height: 4' 11.06" (1.5 m)       In general, she is a very healthy-appearing dysmorphic female in no apparent distress.  She has Down's syndrome.  Eyelids and conjunctiva are free of erythema and drainage.  The eyes, nares, and oropharynx are clear.  The head is normocephalic and atraumatic.  The neck is supple without jugular venous distention or thyroid enlargement.  The lungs are clear to auscultation bilaterally.  There is a well-healed median sternotomy incision.  The first and second heart sounds are normal.  There are no murmurs, gallops, rubs, or clicks in the seated position.  The abdominal exam is benign without hepatosplenomegaly, tenderness, or distention.  Pulses are normal in all 4 extremities with brisk capillary refill and no clubbing, cyanosis, or edema.  No rashes are noted.    I personally reviewed the following tests performed today and my interpretation follows:    Her echocardiogram looks great.  Excellent biventricular function.  No residual shunting.  Mild mitral and tricuspid insufficiency.    Her EKG looks great.  Sinus rhythm with rate 67 beats per minute.  Incomplete right bundle branch block.  Superior axis.    Thank you for referring this patient to our clinic.  Please call with any questions.    Sincerely,        Aakash Zelaya MD  Pediatric Cardiology  Adult Congenital Heart Disease  Pediatric Heart Failure and Transplantation  [facility] [address] [place]  [address]  [phone]          "

## 2023-10-16 ENCOUNTER — CLINICAL SUPPORT (OUTPATIENT)
Dept: PEDIATRIC CARDIOLOGY | Facility: CLINIC | Age: 28
End: 2023-10-16
Payer: COMMERCIAL

## 2023-10-16 ENCOUNTER — OFFICE VISIT (OUTPATIENT)
Dept: PEDIATRIC CARDIOLOGY | Facility: CLINIC | Age: 28
End: 2023-10-16
Payer: COMMERCIAL

## 2023-10-16 VITALS
SYSTOLIC BLOOD PRESSURE: 100 MMHG | BODY MASS INDEX: 21.31 KG/M2 | HEIGHT: 59 IN | WEIGHT: 105.69 LBS | RESPIRATION RATE: 16 BRPM | OXYGEN SATURATION: 100 % | HEART RATE: 68 BPM | DIASTOLIC BLOOD PRESSURE: 50 MMHG

## 2023-10-16 DIAGNOSIS — Z87.74 STATUS POST SURGERY FOR COMPLEX CONGENITAL HEART DISEASE: ICD-10-CM

## 2023-10-16 DIAGNOSIS — Q21.23 ATRIOVENTRICULAR CANAL (AVC), COMPLETE: ICD-10-CM

## 2023-10-16 DIAGNOSIS — Q90.9 DOWN'S SYNDROME: ICD-10-CM

## 2023-10-16 DIAGNOSIS — Q24.9 ADULT CONGENITAL HEART DISEASE: ICD-10-CM

## 2023-10-16 DIAGNOSIS — I34.0 NONRHEUMATIC MITRAL VALVE REGURGITATION: ICD-10-CM

## 2023-10-16 DIAGNOSIS — I44.0 AV BLOCK, 1ST DEGREE: ICD-10-CM

## 2023-10-16 PROCEDURE — 1159F MED LIST DOCD IN RCRD: CPT | Mod: CPTII,S$GLB,, | Performed by: PEDIATRICS

## 2023-10-16 PROCEDURE — 3078F DIAST BP <80 MM HG: CPT | Mod: CPTII,S$GLB,, | Performed by: PEDIATRICS

## 2023-10-16 PROCEDURE — 93000 ELECTROCARDIOGRAM COMPLETE: CPT | Mod: S$GLB,,, | Performed by: PEDIATRICS

## 2023-10-16 PROCEDURE — 93000 EKG 12-LEAD PEDIATRIC: ICD-10-PCS | Mod: S$GLB,,, | Performed by: PEDIATRICS

## 2023-10-16 PROCEDURE — 3074F PR MOST RECENT SYSTOLIC BLOOD PRESSURE < 130 MM HG: ICD-10-PCS | Mod: CPTII,S$GLB,, | Performed by: PEDIATRICS

## 2023-10-16 PROCEDURE — 3074F SYST BP LT 130 MM HG: CPT | Mod: CPTII,S$GLB,, | Performed by: PEDIATRICS

## 2023-10-16 PROCEDURE — 3078F PR MOST RECENT DIASTOLIC BLOOD PRESSURE < 80 MM HG: ICD-10-PCS | Mod: CPTII,S$GLB,, | Performed by: PEDIATRICS

## 2023-10-16 PROCEDURE — 99214 PR OFFICE/OUTPT VISIT, EST, LEVL IV, 30-39 MIN: ICD-10-PCS | Mod: 25,S$GLB,, | Performed by: PEDIATRICS

## 2023-10-16 PROCEDURE — 3008F PR BODY MASS INDEX (BMI) DOCUMENTED: ICD-10-PCS | Mod: CPTII,S$GLB,, | Performed by: PEDIATRICS

## 2023-10-16 PROCEDURE — 3008F BODY MASS INDEX DOCD: CPT | Mod: CPTII,S$GLB,, | Performed by: PEDIATRICS

## 2023-10-16 PROCEDURE — 99214 OFFICE O/P EST MOD 30 MIN: CPT | Mod: 25,S$GLB,, | Performed by: PEDIATRICS

## 2023-10-16 PROCEDURE — 1159F PR MEDICATION LIST DOCUMENTED IN MEDICAL RECORD: ICD-10-PCS | Mod: CPTII,S$GLB,, | Performed by: PEDIATRICS

## 2023-10-16 RX ORDER — BETAMETHASONE DIPROPIONATE 0.5 MG/G
CREAM TOPICAL
COMMUNITY
Start: 2023-10-01

## 2024-06-27 ENCOUNTER — TELEPHONE (OUTPATIENT)
Dept: CARDIOLOGY | Facility: CLINIC | Age: 29
End: 2024-06-27
Payer: COMMERCIAL

## 2024-06-27 NOTE — TELEPHONE ENCOUNTER
[place] clinic is the 3rd Monday of the month, October schedule has not be released at this time. Mom will callback around Aug once to schedule.    ----- Message from Neyda Franco sent at 6/27/2024  4:18 PM CDT -----  Name of Who is Calling: LILIAN HEDRICK [[id number]] Sammie ( mother )       What is the request in detail: pt mother is requesting to set up her f/u appt and testing during the month of October. . Date requested if possible is the 14th or 15th at the [place] location. Please advise    Can the clinic reply by [facility]: YES       What Number to Call Back if not in [facility]: Telephone Information:         [phone]

## 2024-08-19 ENCOUNTER — TELEPHONE (OUTPATIENT)
Dept: CARDIOLOGY | Facility: CLINIC | Age: 29
End: 2024-08-19
Payer: COMMERCIAL

## 2024-08-19 DIAGNOSIS — Q90.9 DOWN'S SYNDROME: ICD-10-CM

## 2024-08-19 DIAGNOSIS — Z87.74 STATUS POST SURGERY FOR COMPLEX CONGENITAL HEART DISEASE: ICD-10-CM

## 2024-08-19 DIAGNOSIS — Q24.9 ADULT CONGENITAL HEART DISEASE: ICD-10-CM

## 2024-08-19 DIAGNOSIS — Q21.23 ATRIOVENTRICULAR CANAL (AVC), COMPLETE: Primary | ICD-10-CM

## 2024-08-19 DIAGNOSIS — I45.10 RIGHT BUNDLE BRANCH BLOCK: ICD-10-CM

## 2024-08-19 DIAGNOSIS — I44.0 AV BLOCK, 1ST DEGREE: ICD-10-CM

## 2024-08-19 NOTE — TELEPHONE ENCOUNTER
Appt scheduled for 11/18 start time 2 PM in our [facility], left all information on voicemail including callback name and number.   ----- Message from Peggy uLx sent at 8/19/2024  9:27 AM CDT -----  Mom need to schedule her daughter a 1 year with echo, ekg, holter in [facility]        Mom [phone]        Thank you  Scheduling

## 2024-11-18 ENCOUNTER — CLINICAL SUPPORT (OUTPATIENT)
Dept: PEDIATRIC CARDIOLOGY | Facility: CLINIC | Age: 29
End: 2024-11-18
Payer: COMMERCIAL

## 2024-11-18 ENCOUNTER — OFFICE VISIT (OUTPATIENT)
Dept: PEDIATRIC CARDIOLOGY | Facility: CLINIC | Age: 29
End: 2024-11-18
Payer: COMMERCIAL

## 2024-11-18 VITALS
OXYGEN SATURATION: 100 % | RESPIRATION RATE: 18 BRPM | HEIGHT: 59 IN | SYSTOLIC BLOOD PRESSURE: 117 MMHG | HEART RATE: 69 BPM | BODY MASS INDEX: 20.96 KG/M2 | WEIGHT: 104 LBS | DIASTOLIC BLOOD PRESSURE: 55 MMHG

## 2024-11-18 DIAGNOSIS — I44.0 AV BLOCK, 1ST DEGREE: ICD-10-CM

## 2024-11-18 DIAGNOSIS — I45.10 RIGHT BUNDLE BRANCH BLOCK: ICD-10-CM

## 2024-11-18 DIAGNOSIS — Q90.9 DOWN'S SYNDROME: ICD-10-CM

## 2024-11-18 DIAGNOSIS — Q24.9 ADULT CONGENITAL HEART DISEASE: ICD-10-CM

## 2024-11-18 DIAGNOSIS — Z87.74 STATUS POST SURGERY FOR COMPLEX CONGENITAL HEART DISEASE: ICD-10-CM

## 2024-11-18 DIAGNOSIS — Q21.23 ATRIOVENTRICULAR CANAL (AVC), COMPLETE: ICD-10-CM

## 2024-11-18 LAB
OHS QRS DURATION: 118 MS
OHS QTC CALCULATION: 429 MS

## 2024-11-18 PROCEDURE — 1160F RVW MEDS BY RX/DR IN RCRD: CPT | Mod: CPTII,S$GLB,, | Performed by: PEDIATRICS

## 2024-11-18 PROCEDURE — 3078F DIAST BP <80 MM HG: CPT | Mod: CPTII,S$GLB,, | Performed by: PEDIATRICS

## 2024-11-18 PROCEDURE — 3008F BODY MASS INDEX DOCD: CPT | Mod: CPTII,S$GLB,, | Performed by: PEDIATRICS

## 2024-11-18 PROCEDURE — 1159F MED LIST DOCD IN RCRD: CPT | Mod: CPTII,S$GLB,, | Performed by: PEDIATRICS

## 2024-11-18 PROCEDURE — 3074F SYST BP LT 130 MM HG: CPT | Mod: CPTII,S$GLB,, | Performed by: PEDIATRICS

## 2024-11-18 PROCEDURE — 99214 OFFICE O/P EST MOD 30 MIN: CPT | Mod: 25,S$GLB,, | Performed by: PEDIATRICS

## 2024-11-18 PROCEDURE — 93000 ELECTROCARDIOGRAM COMPLETE: CPT | Mod: S$GLB,,, | Performed by: PEDIATRICS

## 2024-11-18 NOTE — PROGRESS NOTES
2024    re:Misti August  :1995    [facility]    Hunter Mccarty MD   [address] [facility] [address]    Dear Dr. Mccarty:    Misti August is a 29 y.o. female seen today in the [facility] in [place] for follow-up of her surgically repaired complete AV canal defect.  She also has Down's syndrome.  To summarize, her diagnoses are as follows:  1.  Very well repaired atrioventricular canal with mild residual mitral valve insufficiency and no stenosis.  No residual shunts with excellent biventricular function.  No evidence of pulmonary hypertension.  2.  Down syndrome  3.  Resolved obesity due to improved diet and exercise  4.  Hypothyroidism, well treated  5.  Excellent lipids    Recommendations:  1.  Follow-up in 1 year with echo, EKG, 24 hour holter in our [place] [facility] clinic  2.  Continue healthy diet and exercise  3.  Follow-up with primary care physician regarding treatment of hypothyroidism  4.  I agree with continuing endocarditis prophylaxis before dental work.    Discussion:  She looks great in clinic today.  She has a very well repaired complete atrioventricular canal defect with only mild left and right-sided valve insufficiency.  She has excellent ventricular function and no residual shunting.  She will not require additional surgical intervention for her congenital heart disease.  She is at risk for arrhythmia.  I reassured her mother that the heart looks great.  They are doing a wonderful job with diet and exercise.    Interval history:    She has done very well over the past year.  She walks on the treadmill and dances, and they have been working hard on a healthy diet.  No chest pain, palpitations, syncope, dyspnea on exertion, shortness of breath.      The review of systems is as noted above. It is otherwise negative for other symptoms related to the general, neurological, psychiatric, endocrine,  gastrointestinal, genitourinary, respiratory, dermatologic, musculoskeletal, hematologic, and immunologic systems.    Past Medical History:   Diagnosis Date    Atrioventricular canal (AVC), complete     AV block, 1st degree     Developmental delay     down syndrome    Mitral regurgitation      Past Surgical History:   Procedure Laterality Date    ATRIOVENTRICULAR CANAL REPAIR, COMPLETE       Family History   Problem Relation Name Age of Onset    No Known Problems Mother      No Known Problems Father      No Known Problems Sister 1     No Known Problems Brother 1     No Known Problems Maternal Grandmother      Lung cancer Maternal Grandfather      Heart disease Paternal Grandmother      Lymphoma Paternal Grandmother      Heart disease Paternal Grandfather      Lung cancer Paternal Grandfather      No Known Problems Maternal Aunt      No Known Problems Maternal Uncle      No Known Problems Paternal Aunt      No Known Problems Paternal Uncle      No Known Problems Other      Anemia Neg Hx      Arrhythmia Neg Hx      Asthma Neg Hx      Clotting disorder Neg Hx      Fainting Neg Hx      Heart attack Neg Hx      Heart failure Neg Hx      Hyperlipidemia Neg Hx      Hypertension Neg Hx      Stroke Neg Hx      Atrial Septal Defect Neg Hx       Social History     Socioeconomic History    Marital status: Single   Tobacco Use    Smoking status: Never    Smokeless tobacco: Never   Substance and Sexual Activity    Alcohol use: No    Drug use: No   Social History Narrative    Lives at home with parents.     Current Outpatient Medications on File Prior to Visit   Medication Sig Dispense Refill    betamethasone dipropionate 0.05 % cream SMARTSI Topical Daily      levothyroxine (SYNTHROID) 88 MCG tablet Take 88 mcg by mouth every morning.      multivitamin capsule Take 1 capsule by mouth once daily.      omega-3 fatty acids/fish oil (FISH OIL-OMEGA-3 FATTY ACIDS) 300-1,000 mg capsule Take 1 capsule by mouth once daily.       "TRI-SPRINTEC, 28, 0.18/0.215/0.25 mg-35 mcg (28) tablet Take 1 tablet by mouth once daily.       No current facility-administered medications on file prior to visit.     Review of patient's allergies indicates:  No Known Allergies     Vitals:    11/18/24 1357   BP: (!) 117/55   BP Location: Right arm   Patient Position: Lying   Pulse: 69   Resp: 18   SpO2: 100%   Weight: 47.2 kg (104 lb)   Height: 4' 11.06" (1.5 m)     In general, she is a very healthy-appearing dysmorphic female in no apparent distress.  She has Down's syndrome.  Eyelids and conjunctiva are free of erythema and drainage.  The eyes, nares, and oropharynx are clear.  The head is normocephalic and atraumatic.  The neck is supple without jugular venous distention or thyroid enlargement.  The lungs are clear to auscultation bilaterally.  There is a well-healed median sternotomy incision.  The first and second heart sounds are normal.  There are no murmurs, gallops, rubs, or clicks in the seated position.  The abdominal exam is benign without hepatosplenomegaly, tenderness, or distention.  Pulses are normal in all 4 extremities with brisk capillary refill and no clubbing, cyanosis, or edema.  No rashes are noted.    I personally reviewed the following tests performed today and my interpretation follows:    Her echocardiogram looks great.  Excellent biventricular function.  No residual shunting.  Mild mitral and tricuspid insufficiency.    Her EKG looks great.  Sinus rhythm with rate 67 beats per minute.  Incomplete right bundle branch block.  Superior axis.    Thank you for referring this patient to our clinic.  Please call with any questions.    Sincerely,        Aakash Zelaya MD  Pediatric Cardiology  Adult Congenital Heart Disease  Pediatric Heart Failure and Transplantation  [facility] [address] [place]  [address]  [phone]            "